# Patient Record
Sex: MALE | Race: ASIAN | NOT HISPANIC OR LATINO
[De-identification: names, ages, dates, MRNs, and addresses within clinical notes are randomized per-mention and may not be internally consistent; named-entity substitution may affect disease eponyms.]

---

## 2021-03-19 PROBLEM — Z00.00 ENCOUNTER FOR PREVENTIVE HEALTH EXAMINATION: Status: ACTIVE | Noted: 2021-03-19

## 2021-03-30 ENCOUNTER — APPOINTMENT (OUTPATIENT)
Dept: SURGICAL ONCOLOGY | Facility: CLINIC | Age: 66
End: 2021-03-30
Payer: MEDICARE

## 2021-03-30 VITALS
HEIGHT: 67 IN | SYSTOLIC BLOOD PRESSURE: 148 MMHG | RESPIRATION RATE: 16 BRPM | BODY MASS INDEX: 27.07 KG/M2 | OXYGEN SATURATION: 98 % | DIASTOLIC BLOOD PRESSURE: 92 MMHG | HEART RATE: 88 BPM | WEIGHT: 172.5 LBS

## 2021-03-30 DIAGNOSIS — K86.2 CYST OF PANCREAS: ICD-10-CM

## 2021-03-30 DIAGNOSIS — Z72.89 OTHER PROBLEMS RELATED TO LIFESTYLE: ICD-10-CM

## 2021-03-30 DIAGNOSIS — Z86.79 PERSONAL HISTORY OF OTHER DISEASES OF THE CIRCULATORY SYSTEM: ICD-10-CM

## 2021-03-30 DIAGNOSIS — Z80.0 FAMILY HISTORY OF MALIGNANT NEOPLASM OF DIGESTIVE ORGANS: ICD-10-CM

## 2021-03-30 DIAGNOSIS — Z78.9 OTHER SPECIFIED HEALTH STATUS: ICD-10-CM

## 2021-03-30 PROCEDURE — 99204 OFFICE O/P NEW MOD 45 MIN: CPT

## 2021-03-30 PROCEDURE — 99072 ADDL SUPL MATRL&STAF TM PHE: CPT

## 2021-03-31 ENCOUNTER — TRANSCRIPTION ENCOUNTER (OUTPATIENT)
Age: 66
End: 2021-03-31

## 2021-03-31 PROBLEM — Z78.9 CURRENT NON-SMOKER: Status: ACTIVE | Noted: 2021-03-31

## 2021-03-31 PROBLEM — Z80.0 FAMILY HISTORY OF MALIGNANT NEOPLASM OF STOMACH: Status: ACTIVE | Noted: 2021-03-31

## 2021-03-31 PROBLEM — Z72.89 ALCOHOL USE: Status: ACTIVE | Noted: 2021-03-31

## 2021-03-31 PROBLEM — Z86.79 HISTORY OF HYPERTENSION: Status: RESOLVED | Noted: 2021-03-31 | Resolved: 2021-03-31

## 2021-03-31 RX ORDER — FOSINOPRIL SODIUM 20 MG/1
20 TABLET ORAL
Refills: 0 | Status: ACTIVE | COMMUNITY

## 2021-03-31 NOTE — ASSESSMENT
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  Lesion 2 cm or greater or with high risks feature should undergo resection.  Local wedge resection is needed for grossly negative margin and significant hemigastrectomy is not needed.  There is also no need for rocío dissection.  Need for adjuvant Imatinib treatment will be based on size and mitotic index, but given size and location of his GIST, adjuvant Imatinib need is unlikely.\par Will obtain CT abdomen/pelvis for evaluation of anatomic location and also as baseline evaluation of pancreatic cystic neoplasm.\par He is a candidate for minimally invasive robotic partial gastrectomy.\par Risks/benefits explained.  All questions answered.  Patient wishes to proceed.

## 2021-03-31 NOTE — HISTORY OF PRESENT ILLNESS
[de-identified] : Mr. Krause is a 66 y/o male who underwent EGD 01/29/2021 for GERD.\par There was an incidental finding of a 2 cm subepithelial mass located in the greater curvature near the antrum.\par He was referred and underwent EUS 03/04/2021 which confirmed a 19 mm round subepithelial mass for which FNA was consistent with GIST.  There was an additional finding of a 9 mm anechoic lesion suggestive of a cyst in the pancreatic body without associated septation, mural nodule or pancreatic ductal dilatation.\par He offers no complaints of abdominal pain, nausea/emesis or weight loss.\par -----------------------------------------------------------------------------------------------------------\par Gastroenterology: Dr. Ferdinand Sams.\par Primary care physician: Dr. Ramy Wheeler.\par Advance GI: Dr. Cristi Solorzano.\par -----------------------------------------------------------------------------------------------------------\par

## 2021-03-31 NOTE — REASON FOR VISIT
[Initial Consultation] : an initial consultation for [Referred By: ___] : Referred By: [unfilled] [FreeTextEntry2] : gastric GIST

## 2021-03-31 NOTE — CONSULT LETTER
[Dear  ___] : Dear  [unfilled], [Consult Letter:] : I had the pleasure of evaluating your patient, [unfilled]. [Please see my note below.] : Please see my note below. [Consult Closing:] : Thank you very much for allowing me to participate in the care of this patient.  If you have any questions, please do not hesitate to contact me. [Sincerely,] : Sincerely, [FreeTextEntry2] : Dr. Ferdinand Sams [FreeTextEntry3] : Dallin Chavez\par (M) [phone]\par (O) [phone] [DrBenito  ___] : Dr. CUBA [DrBenito ___] : Dr. CUBA

## 2021-04-01 ENCOUNTER — OUTPATIENT (OUTPATIENT)
Dept: OUTPATIENT SERVICES | Facility: HOSPITAL | Age: 66
LOS: 1 days | End: 2021-04-01
Payer: MEDICARE

## 2021-04-01 ENCOUNTER — APPOINTMENT (OUTPATIENT)
Dept: CT IMAGING | Facility: IMAGING CENTER | Age: 66
End: 2021-04-01
Payer: MEDICARE

## 2021-04-01 DIAGNOSIS — K86.2 CYST OF PANCREAS: ICD-10-CM

## 2021-04-01 DIAGNOSIS — K31.89 OTHER DISEASES OF STOMACH AND DUODENUM: ICD-10-CM

## 2021-04-01 PROCEDURE — 74160 CT ABDOMEN W/CONTRAST: CPT | Mod: 26

## 2021-04-01 PROCEDURE — 74160 CT ABDOMEN W/CONTRAST: CPT

## 2021-04-01 PROCEDURE — 82565 ASSAY OF CREATININE: CPT

## 2021-04-07 ENCOUNTER — OUTPATIENT (OUTPATIENT)
Dept: OUTPATIENT SERVICES | Facility: HOSPITAL | Age: 66
LOS: 1 days | End: 2021-04-07
Payer: MEDICARE

## 2021-04-07 ENCOUNTER — RESULT REVIEW (OUTPATIENT)
Age: 66
End: 2021-04-07

## 2021-04-07 DIAGNOSIS — K31.89 OTHER DISEASES OF STOMACH AND DUODENUM: ICD-10-CM

## 2021-04-07 PROCEDURE — 88321 CONSLTJ&REPRT SLD PREP ELSWR: CPT

## 2021-04-08 ENCOUNTER — OUTPATIENT (OUTPATIENT)
Dept: OUTPATIENT SERVICES | Facility: HOSPITAL | Age: 66
LOS: 1 days | End: 2021-04-08
Payer: MEDICARE

## 2021-04-08 VITALS
HEART RATE: 71 BPM | TEMPERATURE: 97 F | SYSTOLIC BLOOD PRESSURE: 110 MMHG | RESPIRATION RATE: 14 BRPM | HEIGHT: 68 IN | OXYGEN SATURATION: 98 % | WEIGHT: 171.96 LBS | DIASTOLIC BLOOD PRESSURE: 82 MMHG

## 2021-04-08 DIAGNOSIS — G47.33 OBSTRUCTIVE SLEEP APNEA (ADULT) (PEDIATRIC): ICD-10-CM

## 2021-04-08 DIAGNOSIS — I10 ESSENTIAL (PRIMARY) HYPERTENSION: ICD-10-CM

## 2021-04-08 DIAGNOSIS — K31.89 OTHER DISEASES OF STOMACH AND DUODENUM: ICD-10-CM

## 2021-04-08 DIAGNOSIS — K21.9 GASTRO-ESOPHAGEAL REFLUX DISEASE WITHOUT ESOPHAGITIS: ICD-10-CM

## 2021-04-08 DIAGNOSIS — Z98.890 OTHER SPECIFIED POSTPROCEDURAL STATES: Chronic | ICD-10-CM

## 2021-04-08 LAB
ANION GAP SERPL CALC-SCNC: 9 MMOL/L — SIGNIFICANT CHANGE UP (ref 7–14)
BLD GP AB SCN SERPL QL: NEGATIVE — SIGNIFICANT CHANGE UP
BUN SERPL-MCNC: 20 MG/DL — SIGNIFICANT CHANGE UP (ref 7–23)
CALCIUM SERPL-MCNC: 9.5 MG/DL — SIGNIFICANT CHANGE UP (ref 8.4–10.5)
CHLORIDE SERPL-SCNC: 102 MMOL/L — SIGNIFICANT CHANGE UP (ref 98–107)
CO2 SERPL-SCNC: 28 MMOL/L — SIGNIFICANT CHANGE UP (ref 22–31)
CREAT SERPL-MCNC: 0.87 MG/DL — SIGNIFICANT CHANGE UP (ref 0.5–1.3)
GLUCOSE SERPL-MCNC: 103 MG/DL — HIGH (ref 70–99)
HCT VFR BLD CALC: 45 % — SIGNIFICANT CHANGE UP (ref 39–50)
HGB BLD-MCNC: 15.2 G/DL — SIGNIFICANT CHANGE UP (ref 13–17)
MCHC RBC-ENTMCNC: 33 PG — SIGNIFICANT CHANGE UP (ref 27–34)
MCHC RBC-ENTMCNC: 33.8 GM/DL — SIGNIFICANT CHANGE UP (ref 32–36)
MCV RBC AUTO: 97.6 FL — SIGNIFICANT CHANGE UP (ref 80–100)
NRBC # BLD: 0 /100 WBCS — SIGNIFICANT CHANGE UP
NRBC # FLD: 0 K/UL — SIGNIFICANT CHANGE UP
PLATELET # BLD AUTO: 190 K/UL — SIGNIFICANT CHANGE UP (ref 150–400)
POTASSIUM SERPL-MCNC: 4.1 MMOL/L — SIGNIFICANT CHANGE UP (ref 3.5–5.3)
POTASSIUM SERPL-SCNC: 4.1 MMOL/L — SIGNIFICANT CHANGE UP (ref 3.5–5.3)
RBC # BLD: 4.61 M/UL — SIGNIFICANT CHANGE UP (ref 4.2–5.8)
RBC # FLD: 12.3 % — SIGNIFICANT CHANGE UP (ref 10.3–14.5)
RH IG SCN BLD-IMP: POSITIVE — SIGNIFICANT CHANGE UP
SODIUM SERPL-SCNC: 139 MMOL/L — SIGNIFICANT CHANGE UP (ref 135–145)
WBC # BLD: 5.81 K/UL — SIGNIFICANT CHANGE UP (ref 3.8–10.5)
WBC # FLD AUTO: 5.81 K/UL — SIGNIFICANT CHANGE UP (ref 3.8–10.5)

## 2021-04-08 PROCEDURE — 93010 ELECTROCARDIOGRAM REPORT: CPT

## 2021-04-08 RX ORDER — SODIUM CHLORIDE 9 MG/ML
1000 INJECTION, SOLUTION INTRAVENOUS
Refills: 0 | Status: DISCONTINUED | OUTPATIENT
Start: 2021-04-21 | End: 2021-04-23

## 2021-04-08 NOTE — H&P PST ADULT - NSICDXPROBLEM_GEN_ALL_CORE_FT
PROBLEM DIAGNOSES  Problem: Other diseases of stomach and duodenum  Assessment and Plan: Robotic partial gastrectomy ,possible open   Medical clearance as per Dr Chavez  Preop instructions provided and patient verbalizes understanding.  Labs done and results pending.   Hibiclens provided with instructions and was signed by patient. Teach-back method was utilized to assess patient's understanding. Patient verbalized understanding.    Problem: GERD (gastroesophageal reflux disease)  Assessment and Plan: pt instructed to take omeprazole day of surgery.     Problem: Hypertension  Assessment and Plan: pt instructed to take fosinopril day of surgery ,monitor BP during hospital stay .     Problem: KENNETH (obstructive sleep apnea)  Assessment and Plan: Stop Bang questionaire positive ; OR faxed

## 2021-04-08 NOTE — H&P PST ADULT - NSICDXPASTSURGICALHX_GEN_ALL_CORE_FT
PAST SURGICAL HISTORY:  H/O detached retina repair right eye 2005    History of lumbar surgery 2010 - no hardware in place

## 2021-04-08 NOTE — H&P PST ADULT - HISTORY OF PRESENT ILLNESS
This is a 65 y.o. male with other diseases of stomach and duodenum . Pt had EGD, EUS and follow -up CT of abdomen . Pt offers no complaints in pst , now for surgery .

## 2021-04-08 NOTE — H&P PST ADULT - PHARYNX
Perennial with spring and fall allergy symptoms.  Increase him times around cats.  Nasal and ocular symptoms.  Still symptomatic despite oral antihistamines, nasal corticosteroids and ocular antihistamines.     Allergy testing:  Positive for trees, weeds, cat, dog, dust mite, grass, molds.     Cluster immunotherapy   The patient received green 0.1, green 0.4 and blue 0.1ml today. Each dose was  by 30 minutes. Full report will be scanned into electronic medical record. The pateint was in office for over 1.5 hours.      -Nasacort 2 sprays per nostril daily.  -Zyrtec daily.  -Patanol 1 drop per eye twice daily as needed.  -Singulair 10 mg by mouth daily.  -Continue allergy shots.   
no redness

## 2021-04-08 NOTE — H&P PST ADULT - EYES
Called patient and attempted to convey lab results. Patient will call back later as he is at a store right now.    Amylase and lipase are normal. Creatinine slightly elevated but same level it was previously. Dr. Iglesias touched base with Dr. Edmodnson and if pain does not improve patient should follow up with [facility].    PERRL/EOMI/conjunctiva clear detailed exam

## 2021-04-08 NOTE — H&P PST ADULT - NSICDXPASTMEDICALHX_GEN_ALL_CORE_FT
PAST MEDICAL HISTORY:  H/O gastroesophageal reflux (GERD)     History of hepatitis B     Hypertension

## 2021-04-08 NOTE — H&P PST ADULT - NSANTHOSAYNRD_GEN_A_CORE
No. KENNETH screening performed.  STOP BANG Legend: 0-2 = LOW Risk; 3-4 = INTERMEDIATE Risk; 5-8 = HIGH Risk

## 2021-04-12 LAB
ALBUMIN SERPL ELPH-MCNC: 4.6 G/DL — SIGNIFICANT CHANGE UP (ref 3.3–5)
ALP SERPL-CCNC: 58 U/L — SIGNIFICANT CHANGE UP (ref 40–120)
ALT FLD-CCNC: 18 U/L — SIGNIFICANT CHANGE UP (ref 4–41)
AST SERPL-CCNC: 26 U/L — SIGNIFICANT CHANGE UP (ref 4–40)
BILIRUB DIRECT SERPL-MCNC: <0.2 MG/DL — SIGNIFICANT CHANGE UP (ref 0–0.2)
BILIRUB INDIRECT FLD-MCNC: >0.4 MG/DL — SIGNIFICANT CHANGE UP (ref 0–1)
BILIRUB SERPL-MCNC: 0.6 MG/DL — SIGNIFICANT CHANGE UP (ref 0.2–1.2)
PROT SERPL-MCNC: 7.7 G/DL — SIGNIFICANT CHANGE UP (ref 6–8.3)

## 2021-04-18 ENCOUNTER — APPOINTMENT (OUTPATIENT)
Dept: DISASTER EMERGENCY | Facility: CLINIC | Age: 66
End: 2021-04-18

## 2021-04-18 DIAGNOSIS — Z01.818 ENCOUNTER FOR OTHER PREPROCEDURAL EXAMINATION: ICD-10-CM

## 2021-04-18 LAB — SARS-COV-2 N GENE NPH QL NAA+PROBE: NOT DETECTED

## 2021-04-20 ENCOUNTER — TRANSCRIPTION ENCOUNTER (OUTPATIENT)
Age: 66
End: 2021-04-20

## 2021-04-20 LAB — SURGICAL PATHOLOGY STUDY: SIGNIFICANT CHANGE UP

## 2021-04-20 NOTE — ASU PATIENT PROFILE, ADULT - PSH
H/O detached retina repair  right eye 2005  History of lumbar surgery  2010 - no hardware in place

## 2021-04-21 ENCOUNTER — RESULT REVIEW (OUTPATIENT)
Age: 66
End: 2021-04-21

## 2021-04-21 ENCOUNTER — INPATIENT (INPATIENT)
Facility: HOSPITAL | Age: 66
LOS: 1 days | Discharge: HOME | End: 2021-04-23
Attending: SURGERY | Admitting: SURGERY
Payer: MEDICARE

## 2021-04-21 ENCOUNTER — APPOINTMENT (OUTPATIENT)
Dept: SURGICAL ONCOLOGY | Facility: HOSPITAL | Age: 66
End: 2021-04-21

## 2021-04-21 VITALS
RESPIRATION RATE: 18 BRPM | WEIGHT: 171.96 LBS | TEMPERATURE: 98 F | SYSTOLIC BLOOD PRESSURE: 113 MMHG | HEART RATE: 78 BPM | OXYGEN SATURATION: 99 % | DIASTOLIC BLOOD PRESSURE: 71 MMHG | HEIGHT: 68 IN

## 2021-04-21 DIAGNOSIS — Z98.890 OTHER SPECIFIED POSTPROCEDURAL STATES: Chronic | ICD-10-CM

## 2021-04-21 DIAGNOSIS — K31.89 OTHER DISEASES OF STOMACH AND DUODENUM: ICD-10-CM

## 2021-04-21 PROCEDURE — 88341 IMHCHEM/IMCYTCHM EA ADD ANTB: CPT | Mod: 26

## 2021-04-21 PROCEDURE — 49905 OMENTAL FLAP INTRA-ABDOM: CPT

## 2021-04-21 PROCEDURE — 88342 IMHCHEM/IMCYTCHM 1ST ANTB: CPT | Mod: 26

## 2021-04-21 PROCEDURE — S2900 ROBOTIC SURGICAL SYSTEM: CPT | Mod: NC

## 2021-04-21 PROCEDURE — 43500 SURGICAL OPENING OF STOMACH: CPT

## 2021-04-21 PROCEDURE — 88309 TISSUE EXAM BY PATHOLOGIST: CPT | Mod: 26

## 2021-04-21 PROCEDURE — 43611 EXCISION OF STOMACH LESION: CPT

## 2021-04-21 PROCEDURE — 93010 ELECTROCARDIOGRAM REPORT: CPT

## 2021-04-21 RX ORDER — HYDROMORPHONE HYDROCHLORIDE 2 MG/ML
0.5 INJECTION INTRAMUSCULAR; INTRAVENOUS; SUBCUTANEOUS
Refills: 0 | Status: DISCONTINUED | OUTPATIENT
Start: 2021-04-21 | End: 2021-04-21

## 2021-04-21 RX ORDER — SODIUM CHLORIDE 9 MG/ML
250 INJECTION, SOLUTION INTRAVENOUS ONCE
Refills: 0 | Status: COMPLETED | OUTPATIENT
Start: 2021-04-21 | End: 2021-04-21

## 2021-04-21 RX ORDER — OMEPRAZOLE 10 MG/1
1 CAPSULE, DELAYED RELEASE ORAL
Qty: 0 | Refills: 0 | DISCHARGE

## 2021-04-21 RX ORDER — ACETAMINOPHEN 500 MG
650 TABLET ORAL EVERY 6 HOURS
Refills: 0 | Status: COMPLETED | OUTPATIENT
Start: 2021-04-21 | End: 2021-04-22

## 2021-04-21 RX ORDER — ONDANSETRON 8 MG/1
4 TABLET, FILM COATED ORAL ONCE
Refills: 0 | Status: DISCONTINUED | OUTPATIENT
Start: 2021-04-21 | End: 2021-04-21

## 2021-04-21 RX ORDER — FENTANYL CITRATE 50 UG/ML
25 INJECTION INTRAVENOUS
Refills: 0 | Status: DISCONTINUED | OUTPATIENT
Start: 2021-04-21 | End: 2021-04-21

## 2021-04-21 RX ORDER — FOSINOPRIL SODIUM 10 MG/1
1 TABLET ORAL
Qty: 0 | Refills: 0 | DISCHARGE

## 2021-04-21 RX ORDER — ENOXAPARIN SODIUM 100 MG/ML
40 INJECTION SUBCUTANEOUS EVERY 24 HOURS
Refills: 0 | Status: DISCONTINUED | OUTPATIENT
Start: 2021-04-21 | End: 2021-04-23

## 2021-04-21 RX ORDER — ERGOCALCIFEROL 1.25 MG/1
1 CAPSULE ORAL
Qty: 0 | Refills: 0 | DISCHARGE

## 2021-04-21 RX ORDER — METOPROLOL TARTRATE 50 MG
5 TABLET ORAL ONCE
Refills: 0 | Status: DISCONTINUED | OUTPATIENT
Start: 2021-04-21 | End: 2021-04-21

## 2021-04-21 RX ORDER — CEFOTETAN DISODIUM 1 G
2 VIAL (EA) INJECTION EVERY 12 HOURS
Refills: 0 | Status: COMPLETED | OUTPATIENT
Start: 2021-04-21 | End: 2021-04-22

## 2021-04-21 RX ORDER — METOPROLOL TARTRATE 50 MG
5 TABLET ORAL ONCE
Refills: 0 | Status: COMPLETED | OUTPATIENT
Start: 2021-04-21 | End: 2021-04-21

## 2021-04-21 RX ORDER — SODIUM CHLORIDE 9 MG/ML
500 INJECTION, SOLUTION INTRAVENOUS ONCE
Refills: 0 | Status: COMPLETED | OUTPATIENT
Start: 2021-04-21 | End: 2021-04-21

## 2021-04-21 RX ORDER — PANTOPRAZOLE SODIUM 20 MG/1
40 TABLET, DELAYED RELEASE ORAL EVERY 24 HOURS
Refills: 0 | Status: DISCONTINUED | OUTPATIENT
Start: 2021-04-21 | End: 2021-04-23

## 2021-04-21 RX ADMIN — PANTOPRAZOLE SODIUM 40 MILLIGRAM(S): 20 TABLET, DELAYED RELEASE ORAL at 23:35

## 2021-04-21 RX ADMIN — HYDROMORPHONE HYDROCHLORIDE 0.5 MILLIGRAM(S): 2 INJECTION INTRAMUSCULAR; INTRAVENOUS; SUBCUTANEOUS at 15:00

## 2021-04-21 RX ADMIN — SODIUM CHLORIDE 1000 MILLILITER(S): 9 INJECTION, SOLUTION INTRAVENOUS at 16:00

## 2021-04-21 RX ADMIN — SODIUM CHLORIDE 500 MILLILITER(S): 9 INJECTION, SOLUTION INTRAVENOUS at 13:21

## 2021-04-21 RX ADMIN — Medication 260 MILLIGRAM(S): at 19:43

## 2021-04-21 RX ADMIN — SODIUM CHLORIDE 75 MILLILITER(S): 9 INJECTION, SOLUTION INTRAVENOUS at 13:01

## 2021-04-21 RX ADMIN — Medication 650 MILLIGRAM(S): at 20:03

## 2021-04-21 RX ADMIN — ENOXAPARIN SODIUM 40 MILLIGRAM(S): 100 INJECTION SUBCUTANEOUS at 23:35

## 2021-04-21 RX ADMIN — HYDROMORPHONE HYDROCHLORIDE 0.5 MILLIGRAM(S): 2 INJECTION INTRAMUSCULAR; INTRAVENOUS; SUBCUTANEOUS at 14:45

## 2021-04-21 RX ADMIN — SODIUM CHLORIDE 30 MILLILITER(S): 9 INJECTION, SOLUTION INTRAVENOUS at 06:13

## 2021-04-21 RX ADMIN — Medication 100 GRAM(S): at 23:36

## 2021-04-21 RX ADMIN — Medication 5 MILLIGRAM(S): at 16:27

## 2021-04-21 NOTE — BRIEF OPERATIVE NOTE - OPERATION/FINDINGS
Robotic partial gastrectomy for GIST. Initial gastrectomy performed with stapling device, after EGD showed narrowing in the lumen, the staple line taken down and the gastrostomy closed with 2-0 V-lock transversely. EGD performed again demonstrating patent lumen, leak test negative

## 2021-04-21 NOTE — BRIEF OPERATIVE NOTE - NSICDXBRIEFPROCEDURE_GEN_ALL_CORE_FT
PROCEDURES:  Robot-assisted partial gastrectomy 21-Apr-2021 12:56:12  Abdulaziz Melton  EGD 21-Apr-2021 12:57:29  Abdulaziz Melton

## 2021-04-21 NOTE — CHART NOTE - NSCHARTNOTEFT_GEN_A_CORE
STATUS POST:  Robotic Partial Gastrectomy    POST OPERATIVE DAY #: 0    SUBJECTIVE: Pt seen and examined in PACU. He reports his pain is well controlled, he denies any nausea, vomiting, chest pain or shortness of breath.       Vital Signs Last 24 Hrs  T(C): 36.9 (21 Apr 2021 14:30), Max: 36.9 (21 Apr 2021 14:30)  T(F): 98.5 (21 Apr 2021 14:30), Max: 98.5 (21 Apr 2021 14:30)  HR: 113 (21 Apr 2021 14:45) (78 - 116)  BP: 122/80 (21 Apr 2021 14:45) (113/71 - 134/87)  BP(mean): 92 (21 Apr 2021 14:45) (88 - 101)  RR: 14 (21 Apr 2021 14:45) (13 - 18)  SpO2: 99% (21 Apr 2021 14:45) (93% - 100%)  I&O's Summary    21 Apr 2021 07:01  -  21 Apr 2021 15:10  --------------------------------------------------------  IN: 475 mL / OUT: 115 mL / NET: 360 mL      I&O's Detail    21 Apr 2021 07:01  -  21 Apr 2021 15:10  --------------------------------------------------------  IN:    Lactated Ringers: 225 mL    Lactated Ringers Bolus: 250 mL  Total IN: 475 mL    OUT:    Indwelling Catheter - Urethral (mL): 115 mL  Total OUT: 115 mL    Total NET: 360 mL      MEDICATIONS  (STANDING):  acetaminophen  IVPB .. 650 milliGRAM(s) IV Intermittent every 6 hours  cefoTEtan  IVPB 2 Gram(s) IV Intermittent every 12 hours  enoxaparin Injectable 40 milliGRAM(s) SubCutaneous every 24 hours  lactated ringers Bolus 500 milliLiter(s) IV Bolus once  lactated ringers. 1000 milliLiter(s) (75 mL/Hr) IV Continuous <Continuous>  pantoprazole  Injectable 40 milliGRAM(s) IV Push every 24 hours    MEDICATIONS  (PRN):  fentaNYL    Injectable 25 MICROGram(s) IV Push every 5 minutes PRN Moderate Pain (4 - 6)  HYDROmorphone  Injectable 0.5 milliGRAM(s) IV Push every 10 minutes PRN Severe Pain (7 - 10)  ondansetron Injectable 4 milliGRAM(s) IV Push once PRN Nausea and/or Vomiting        PHYSICAL EXAM:    Gen: NAD, A&Ox3  Chest: non labored breathing  Abd: soft, NTND, no evidence of rebound or guarding, robotic port sites are clean, dry and intact   Ext: warm, well perfused  : kahn catheter in place draining clear yellow urine        A/P: 65y Male s/p robotic partial gastrectomy    -NPO  -Pain Control  -DVT PPx  -AM Labs  -Encourage OOB/IS use   -Christina op ABx    DTeam Surgery P. [id number]

## 2021-04-21 NOTE — BRIEF OPERATIVE NOTE - NSICDXBRIEFPREOP_GEN_ALL_CORE_FT
PRE-OP DIAGNOSIS:  Gastrointestinal stromal tumor (GIST) of stomach 21-Apr-2021 12:56:21  Abdulaziz Melton

## 2021-04-22 LAB
ALBUMIN SERPL ELPH-MCNC: 3.8 G/DL — SIGNIFICANT CHANGE UP (ref 3.3–5)
ALP SERPL-CCNC: 42 U/L — SIGNIFICANT CHANGE UP (ref 40–120)
ALT FLD-CCNC: 67 U/L — HIGH (ref 4–41)
ANION GAP SERPL CALC-SCNC: 9 MMOL/L — SIGNIFICANT CHANGE UP (ref 7–14)
AST SERPL-CCNC: 59 U/L — HIGH (ref 4–40)
BILIRUB SERPL-MCNC: 1 MG/DL — SIGNIFICANT CHANGE UP (ref 0.2–1.2)
BUN SERPL-MCNC: 19 MG/DL — SIGNIFICANT CHANGE UP (ref 7–23)
CALCIUM SERPL-MCNC: 8.4 MG/DL — SIGNIFICANT CHANGE UP (ref 8.4–10.5)
CHLORIDE SERPL-SCNC: 103 MMOL/L — SIGNIFICANT CHANGE UP (ref 98–107)
CO2 SERPL-SCNC: 25 MMOL/L — SIGNIFICANT CHANGE UP (ref 22–31)
CREAT SERPL-MCNC: 0.91 MG/DL — SIGNIFICANT CHANGE UP (ref 0.5–1.3)
GLUCOSE SERPL-MCNC: 97 MG/DL — SIGNIFICANT CHANGE UP (ref 70–99)
HCT VFR BLD CALC: 40.7 % — SIGNIFICANT CHANGE UP (ref 39–50)
HGB BLD-MCNC: 14.1 G/DL — SIGNIFICANT CHANGE UP (ref 13–17)
MAGNESIUM SERPL-MCNC: 2.1 MG/DL — SIGNIFICANT CHANGE UP (ref 1.6–2.6)
MCHC RBC-ENTMCNC: 33.6 PG — SIGNIFICANT CHANGE UP (ref 27–34)
MCHC RBC-ENTMCNC: 34.6 GM/DL — SIGNIFICANT CHANGE UP (ref 32–36)
MCV RBC AUTO: 96.9 FL — SIGNIFICANT CHANGE UP (ref 80–100)
NRBC # BLD: 0 /100 WBCS — SIGNIFICANT CHANGE UP
NRBC # FLD: 0 K/UL — SIGNIFICANT CHANGE UP
PHOSPHATE SERPL-MCNC: 2.7 MG/DL — SIGNIFICANT CHANGE UP (ref 2.5–4.5)
PLATELET # BLD AUTO: 180 K/UL — SIGNIFICANT CHANGE UP (ref 150–400)
POTASSIUM SERPL-MCNC: 3.8 MMOL/L — SIGNIFICANT CHANGE UP (ref 3.5–5.3)
POTASSIUM SERPL-SCNC: 3.8 MMOL/L — SIGNIFICANT CHANGE UP (ref 3.5–5.3)
PROT SERPL-MCNC: 6.3 G/DL — SIGNIFICANT CHANGE UP (ref 6–8.3)
RBC # BLD: 4.2 M/UL — SIGNIFICANT CHANGE UP (ref 4.2–5.8)
RBC # FLD: 11.9 % — SIGNIFICANT CHANGE UP (ref 10.3–14.5)
SODIUM SERPL-SCNC: 137 MMOL/L — SIGNIFICANT CHANGE UP (ref 135–145)
WBC # BLD: 7.75 K/UL — SIGNIFICANT CHANGE UP (ref 3.8–10.5)
WBC # FLD AUTO: 7.75 K/UL — SIGNIFICANT CHANGE UP (ref 3.8–10.5)

## 2021-04-22 RX ORDER — POTASSIUM CHLORIDE 20 MEQ
10 PACKET (EA) ORAL ONCE
Refills: 0 | Status: COMPLETED | OUTPATIENT
Start: 2021-04-22 | End: 2021-04-22

## 2021-04-22 RX ADMIN — SODIUM CHLORIDE 75 MILLILITER(S): 9 INJECTION, SOLUTION INTRAVENOUS at 15:52

## 2021-04-22 RX ADMIN — Medication 260 MILLIGRAM(S): at 13:13

## 2021-04-22 RX ADMIN — PANTOPRAZOLE SODIUM 40 MILLIGRAM(S): 20 TABLET, DELAYED RELEASE ORAL at 22:19

## 2021-04-22 RX ADMIN — ENOXAPARIN SODIUM 40 MILLIGRAM(S): 100 INJECTION SUBCUTANEOUS at 22:19

## 2021-04-22 RX ADMIN — Medication 260 MILLIGRAM(S): at 05:51

## 2021-04-22 RX ADMIN — Medication 100 GRAM(S): at 05:52

## 2021-04-22 RX ADMIN — Medication 650 MILLIGRAM(S): at 14:01

## 2021-04-22 RX ADMIN — Medication 650 MILLIGRAM(S): at 06:19

## 2021-04-22 RX ADMIN — Medication 260 MILLIGRAM(S): at 22:18

## 2021-04-22 RX ADMIN — Medication 100 MILLIEQUIVALENT(S): at 09:00

## 2021-04-22 NOTE — PROGRESS NOTE ADULT - ASSESSMENT
Assessment  65y Male s/p robotic partial gastrectomy 4/21, recovering appropriately.    Plan:  -NPO  -Pain Control  -DVT PPx  -AM Labs  -Encourage OOB/IS use   -Christina op ABx    D Team Surgery   [id number]   Assessment  65y Male s/p robotic partial gastrectomy 4/21, recovering appropriately.    Plan:  - CLD  - Pain Control, ATC tylenol  - PT consult  - SACHI Puga in afternoon  - DVT PPx  - AM Labs  - Encourage OOB/IS use   - Christina op ABx    D Team Surgery   [id number]

## 2021-04-22 NOTE — PHYSICAL THERAPY INITIAL EVALUATION ADULT - PATIENT PROFILE REVIEW, REHAB EVAL
PT orders received: no formal activtiy order. Consult with ELPIDIO VILLASENOR, pt may participate in PT evaluation./yes PT orders received: no formal activity order. Consult with RN  Wale VILLASENOR, pt may participate in PT evaluation./yes

## 2021-04-22 NOTE — PROGRESS NOTE ADULT - ATTENDING COMMENTS
start clear liquids.  advance diet as tolerated.  expectant discharge tomorrow if tolerating regular diet.

## 2021-04-22 NOTE — PHYSICAL THERAPY INITIAL EVALUATION ADULT - PERTINENT HX OF CURRENT PROBLEM, REHAB EVAL
Patient is a 65 year old male admitted to [facility] with Gastrointestinal stromal tumor for scheduled procedure.

## 2021-04-22 NOTE — PHYSICAL THERAPY INITIAL EVALUATION ADULT - ADDITIONAL COMMENTS
Patient reports he lives with his wife in a private house, 3 steps to enter and 6 within. Patient reports he was previously independent in all ADLs and did not use an assistive device for ambulation.    Patient was left sitting at the edge of the bed, all lines/tubes intact and call bell within reach, RN aware

## 2021-04-22 NOTE — PROGRESS NOTE ADULT - SUBJECTIVE AND OBJECTIVE BOX
Surgery Progress Note    SUBJECTIVE: Pt seen and examined at bedside. No acute events overnight. This AM, patient comfortable and in no-apparent distress. No nausea, vomiting, diarrhea. Pain is controlled.    Vital Signs Last 24 Hrs  T(C): 37 (22 Apr 2021 00:36), Max: 37 (22 Apr 2021 00:36)  T(F): 98.6 (22 Apr 2021 00:36), Max: 98.6 (22 Apr 2021 00:36)  HR: 89 (22 Apr 2021 00:36) (78 - 116)  BP: 111/74 (22 Apr 2021 00:36) (108/84 - 134/87)  BP(mean): 99 (21 Apr 2021 20:00) (88 - 101)  RR: 18 (22 Apr 2021 00:36) (12 - 19)  SpO2: 98% (22 Apr 2021 00:36) (93% - 100%)    Physical Exam:  General Appearance: Appears well, NAD  Respiratory: No labored breathing  CV: Pulse regularly present  Abdomen: Soft, nontender, nondistended; port site incisions with dressings c/d/i  Genitourinary: oley catheter in place draining clear yellow urine    LABS:                INs and OUTs:    04-21-21 @ 07:01  -  04-22-21 @ 04:29  --------------------------------------------------------  IN: 1800 mL / OUT: 2040 mL / NET: -240 mL

## 2021-04-23 ENCOUNTER — TRANSCRIPTION ENCOUNTER (OUTPATIENT)
Age: 66
End: 2021-04-23

## 2021-04-23 VITALS
OXYGEN SATURATION: 98 % | RESPIRATION RATE: 17 BRPM | TEMPERATURE: 98 F | SYSTOLIC BLOOD PRESSURE: 143 MMHG | DIASTOLIC BLOOD PRESSURE: 96 MMHG | HEART RATE: 94 BPM

## 2021-04-23 LAB
ALBUMIN SERPL ELPH-MCNC: 4 G/DL — SIGNIFICANT CHANGE UP (ref 3.3–5)
ALP SERPL-CCNC: 48 U/L — SIGNIFICANT CHANGE UP (ref 40–120)
ALT FLD-CCNC: 68 U/L — HIGH (ref 4–41)
ANION GAP SERPL CALC-SCNC: 13 MMOL/L — SIGNIFICANT CHANGE UP (ref 7–14)
AST SERPL-CCNC: 48 U/L — HIGH (ref 4–40)
BILIRUB SERPL-MCNC: 1.4 MG/DL — HIGH (ref 0.2–1.2)
BUN SERPL-MCNC: 13 MG/DL — SIGNIFICANT CHANGE UP (ref 7–23)
CALCIUM SERPL-MCNC: 9 MG/DL — SIGNIFICANT CHANGE UP (ref 8.4–10.5)
CHLORIDE SERPL-SCNC: 103 MMOL/L — SIGNIFICANT CHANGE UP (ref 98–107)
CO2 SERPL-SCNC: 24 MMOL/L — SIGNIFICANT CHANGE UP (ref 22–31)
CREAT SERPL-MCNC: 0.96 MG/DL — SIGNIFICANT CHANGE UP (ref 0.5–1.3)
GLUCOSE SERPL-MCNC: 106 MG/DL — HIGH (ref 70–99)
HCT VFR BLD CALC: 46.7 % — SIGNIFICANT CHANGE UP (ref 39–50)
HGB BLD-MCNC: 15.9 G/DL — SIGNIFICANT CHANGE UP (ref 13–17)
MAGNESIUM SERPL-MCNC: 2.2 MG/DL — SIGNIFICANT CHANGE UP (ref 1.6–2.6)
MCHC RBC-ENTMCNC: 33 PG — SIGNIFICANT CHANGE UP (ref 27–34)
MCHC RBC-ENTMCNC: 34 GM/DL — SIGNIFICANT CHANGE UP (ref 32–36)
MCV RBC AUTO: 96.9 FL — SIGNIFICANT CHANGE UP (ref 80–100)
NRBC # BLD: 0 /100 WBCS — SIGNIFICANT CHANGE UP
NRBC # FLD: 0 K/UL — SIGNIFICANT CHANGE UP
PHOSPHATE SERPL-MCNC: 2.3 MG/DL — LOW (ref 2.5–4.5)
PLATELET # BLD AUTO: 173 K/UL — SIGNIFICANT CHANGE UP (ref 150–400)
POTASSIUM SERPL-MCNC: 3.9 MMOL/L — SIGNIFICANT CHANGE UP (ref 3.5–5.3)
POTASSIUM SERPL-SCNC: 3.9 MMOL/L — SIGNIFICANT CHANGE UP (ref 3.5–5.3)
PROT SERPL-MCNC: 7.2 G/DL — SIGNIFICANT CHANGE UP (ref 6–8.3)
RBC # BLD: 4.82 M/UL — SIGNIFICANT CHANGE UP (ref 4.2–5.8)
RBC # FLD: 12 % — SIGNIFICANT CHANGE UP (ref 10.3–14.5)
SODIUM SERPL-SCNC: 140 MMOL/L — SIGNIFICANT CHANGE UP (ref 135–145)
WBC # BLD: 7.36 K/UL — SIGNIFICANT CHANGE UP (ref 3.8–10.5)
WBC # FLD AUTO: 7.36 K/UL — SIGNIFICANT CHANGE UP (ref 3.8–10.5)

## 2021-04-23 RX ORDER — PSYLLIUM SEED (WITH DEXTROSE)
1 POWDER (GRAM) ORAL DAILY
Refills: 0 | Status: DISCONTINUED | OUTPATIENT
Start: 2021-04-23 | End: 2021-04-23

## 2021-04-23 RX ORDER — PSYLLIUM SEED (WITH DEXTROSE)
1 POWDER (GRAM) ORAL
Qty: 0 | Refills: 0 | DISCHARGE
Start: 2021-04-23

## 2021-04-23 RX ADMIN — Medication 1 PACKET(S): at 10:12

## 2021-04-23 NOTE — DISCHARGE NOTE NURSING/CASE MANAGEMENT/SOCIAL WORK - PATIENT PORTAL LINK FT
You can access the FollowMyHealth Patient Portal offered by [facility] by registering at the following website: [email or web address]. By joining [facility]’s FollowMyHealth portal, you will also be able to view your health information using other applications (apps) compatible with our system.

## 2021-04-23 NOTE — DISCHARGE NOTE PROVIDER - NSDCFUADDAPPT_GEN_ALL_CORE_FT
Follow up with your surgeon, Dr. Chavez, call for an appointment. Please follow up with your primary care physician regarding your hospitalization. Do not drive while taking pain medications

## 2021-04-23 NOTE — DISCHARGE NOTE NURSING/CASE MANAGEMENT/SOCIAL WORK - NSDCPNINST_GEN_ALL_CORE
please call your provider or go to the ER if you experience any chest pain, shortness of breath, uncontrolled pain, fever > 100.4, nausea/vomiting, or pus/bleeding from surgical incision

## 2021-04-23 NOTE — PROGRESS NOTE ADULT - SUBJECTIVE AND OBJECTIVE BOX
Surgery Progress Note    SUBJECTIVE: Pt seen and examined at bedside. No acute events overnight. This AM, patient comfortable and in no-apparent distress. Pain is controlled. Tolerating full liquids without nausea or vomiting.    Vital Signs Last 24 Hrs  T(C): 37.1 (23 Apr 2021 00:30), Max: 37.6 (22 Apr 2021 20:57)  T(F): 98.7 (23 Apr 2021 00:30), Max: 99.7 (22 Apr 2021 20:57)  HR: 90 (23 Apr 2021 00:30) (78 - 92)  BP: 110/85 (23 Apr 2021 00:30) (110/85 - 132/92)  BP(mean): --  RR: 18 (23 Apr 2021 00:30) (18 - 18)  SpO2: 96% (23 Apr 2021 00:30) (96% - 100%)    Physical Exam:  General Appearance: Appears well, NAD  Respiratory: No labored breathing  CV: Pulse regularly present  Abdomen: Soft, nontender, nondistended; port site incisions with dressings c/d/i      LABS:                        14.1   7.75  )-----------( 180      ( 22 Apr 2021 06:35 )             40.7     04-22    137  |  103  |  19  ----------------------------<  97  3.8   |  25  |  0.91    Ca    8.4      22 Apr 2021 06:35  Phos  2.7     04-22  Mg     2.1     04-22    TPro  6.3  /  Alb  3.8  /  TBili  1.0  /  DBili  x   /  AST  59<H>  /  ALT  67<H>  /  AlkPhos  42  04-22          INs and OUTs:    04-21-21 @ 07:01  -  04-22-21 @ 07:00  --------------------------------------------------------  IN: 2250 mL / OUT: 2800 mL / NET: -550 mL    04-22-21 @ 07:01  -  04-23-21 @ 02:02  --------------------------------------------------------  IN: 700 mL / OUT: 0 mL / NET: 700 mL     Surgery Progress Note    SUBJECTIVE: Pt seen and examined at bedside. No acute events overnight. This AM, patient comfortable and in no-apparent distress. Pain is controlled. Tolerating full liquids without nausea or vomiting.    Vital Signs Last 24 Hrs  T(C): 37.1 (23 Apr 2021 00:30), Max: 37.6 (22 Apr 2021 20:57)  T(F): 98.7 (23 Apr 2021 00:30), Max: 99.7 (22 Apr 2021 20:57)  HR: 90 (23 Apr 2021 00:30) (78 - 92)  BP: 110/85 (23 Apr 2021 00:30) (110/85 - 132/92)  BP(mean): --  RR: 18 (23 Apr 2021 00:30) (18 - 18)  SpO2: 96% (23 Apr 2021 00:30) (96% - 100%)    Physical Exam:  GEN: resting in bed comfortably in NAD  RESP: no increased WOB  ABD: soft, non-distended, non-tender without rebound tenderness or guarding; port site incisions with dressings c/d/i  EXTR: warm, well-perfused without gross deformities; spontaneous movement in b/l U/L extrem  NEURO: awake, alert       LABS:                        14.1   7.75  )-----------( 180      ( 22 Apr 2021 06:35 )             40.7     04-22    137  |  103  |  19  ----------------------------<  97  3.8   |  25  |  0.91    Ca    8.4      22 Apr 2021 06:35  Phos  2.7     04-22  Mg     2.1     04-22    TPro  6.3  /  Alb  3.8  /  TBili  1.0  /  DBili  x   /  AST  59<H>  /  ALT  67<H>  /  AlkPhos  42  04-22          INs and OUTs:    04-21-21 @ 07:01  -  04-22-21 @ 07:00  --------------------------------------------------------  IN: 2250 mL / OUT: 2800 mL / NET: -550 mL    04-22-21 @ 07:01  -  04-23-21 @ 02:02  --------------------------------------------------------  IN: 700 mL / OUT: 0 mL / NET: 700 mL

## 2021-04-23 NOTE — DISCHARGE NOTE PROVIDER - CARE PROVIDER_API CALL
Dallin Chavez)  Surgery  [address] [place] [address]  Phone: [phone]  Fax: [phone]  Follow Up Time: 1 week

## 2021-04-23 NOTE — DISCHARGE NOTE PROVIDER - HOSPITAL COURSE
This is a 65 y.o. male with other diseases of stomach and duodenum . Pt had EGD, EUS and follow -up CT of abdomen . Pt offers no complaints in pst , now for surgery  for his GIST.  Patient taken to the OR s/p robotic partial gastrectomy for GIST. Initial gastrectomy performed with stapling device, after EGD showed narrowing in the lumen, the staple line taken down and the gastrostomy closed with 2-0 V-lock transversely. EGD performed again demonstrating patent lumen, leak test negative  Post operatively patient hemodynamically stable and transferred to the floor.  Diet advanced and tolerated. Pain well controlled and patient OOB and ambulating.  Patient stable for dc home to follow up as an outpatient   This is a 65 y.o. male with other diseases of stomach and duodenum . Pt had EGD, EUS and follow -up CT of abdomen . Pt offers no complaints in pst , now for surgery  for his GIST.  Patient taken to the OR s/p robotic partial gastrectomy for GIST. Initial gastrectomy performed with stapling device, after EGD showed narrowing in the lumen, the staple line taken down and the gastrostomy closed with 2-0 V-lock transversely. EGD performed again demonstrating patent lumen, leak test negative  Post operatively patient hemodynamically stable and transferred to the floor.  Diet advanced and tolerated. Pain well controlled and patient OOB and ambulating.  Patient stable for dc home to follow up as an outpatient.

## 2021-04-23 NOTE — DISCHARGE NOTE PROVIDER - NSDCFUADDINST_GEN_ALL_CORE_FT
WOUND CARE:  Please keep incisions clean and dry. Please do not Scrub or rub incisions. Do not use lotion or powder on incisions.   BATHING: You may shower and/or sponge bathe. You may use warm soapy water in the shower and rinse, pat dry.  ACTIVITY: No heavy lifting or straining. Otherwise, you may return to your usual level of physical activity. If you are taking narcotic pain medication DO NOT drive a car, operate machinery or make important decisions.  DIET: Return to your usual diet.  NOTIFY YOUR SURGEON IF YOU HAVE: any bleeding that does not stop, any pus draining from your wound(s), any fever (over 100.4 F) persistent nausea/vomiting, or if your pain is not controlled on your discharge pain medications, unable to urinate.  Please follow up with your primary care physician in one week regarding your hospitalization, bring copies of your discharge paperwork.  Please follow up with your surgeon, Dr. Chavez as an outpatient, please call to schedule appointment

## 2021-04-23 NOTE — DISCHARGE NOTE PROVIDER - NSDCMRMEDTOKEN_GEN_ALL_CORE_FT
fosinopril 20 mg oral tablet: 1 tab(s) orally once a day  omeprazole 40 mg oral delayed release capsule: 1 cap(s) orally once a day  Vitamin D2 50,000 intl units (1.25 mg) oral capsule: 1 cap(s) orally once a week   fosinopril 20 mg oral tablet: 1 tab(s) orally once a day  omeprazole 40 mg oral delayed release capsule: 1 cap(s) orally once a day  psyllium 3.4 g/7 g oral powder for reconstitution: 1 packet(s) orally once a day  Vitamin D2 50,000 intl units (1.25 mg) oral capsule: 1 cap(s) orally once a week

## 2021-04-23 NOTE — DISCHARGE NOTE PROVIDER - NSDCCPCAREPLAN_GEN_ALL_CORE_FT
PRINCIPAL DISCHARGE DIAGNOSIS  Diagnosis: Gastrointestinal stromal tumor (GIST)  Assessment and Plan of Treatment:

## 2021-04-23 NOTE — PROGRESS NOTE ADULT - ASSESSMENT
Assessment  65y Male s/p robotic partial gastrectomy 4/21, recovering appropriately.    Plan:  - Diet: full liquids  - Pain Control, ATC tylenol  - PT consult  - Sanon removed 4/22  - DVT PPx  - AM Labs  - Encourage OOB/IS use   - Christina op ABx    D Team Surgery   [id number] Assessment  65y Male s/p robotic partial gastrectomy 4/21, recovering appropriately. Plan to d/c home today    Plan:  - Diet: advance to regular diet today  - Pain Control, ATC tylenol  - PT consult  - DVT PPx  - AM Labs  - Encourage OOB/IS use   - D/c home today    D Team Surgery   [id number]

## 2021-04-28 LAB — SURGICAL PATHOLOGY STUDY: SIGNIFICANT CHANGE UP

## 2021-05-10 PROBLEM — Z86.19 PERSONAL HISTORY OF OTHER INFECTIOUS AND PARASITIC DISEASES: Chronic | Status: ACTIVE | Noted: 2021-04-08

## 2021-05-10 PROBLEM — Z87.19 PERSONAL HISTORY OF OTHER DISEASES OF THE DIGESTIVE SYSTEM: Chronic | Status: ACTIVE | Noted: 2021-04-08

## 2021-05-10 PROBLEM — I10 ESSENTIAL (PRIMARY) HYPERTENSION: Chronic | Status: ACTIVE | Noted: 2021-04-08

## 2021-05-12 ENCOUNTER — APPOINTMENT (OUTPATIENT)
Dept: SURGICAL ONCOLOGY | Facility: CLINIC | Age: 66
End: 2021-05-12
Payer: MEDICARE

## 2021-05-12 DIAGNOSIS — K31.89 OTHER DISEASES OF STOMACH AND DUODENUM: ICD-10-CM

## 2021-05-12 PROCEDURE — 99024 POSTOP FOLLOW-UP VISIT: CPT

## 2021-11-02 NOTE — ASU PREOP CHECKLIST - MEDICAL/PEDIATRIC CLEARANCE ON MEDICAL RECORD
on chart V-Y Flap Text: The defect edges were debeveled with a #15 scalpel blade.  Given the location of the defect, shape of the defect and the proximity to free margins a V-Y flap was deemed most appropriate.  Using a sterile surgical marker, an appropriate advancement flap was drawn incorporating the defect and placing the expected incisions within the relaxed skin tension lines where possible.    The area thus outlined was incised deep to adipose tissue with a #15 scalpel blade.  The skin margins were undermined to an appropriate distance in all directions utilizing iris scissors.

## 2021-11-25 ENCOUNTER — TRANSCRIPTION ENCOUNTER (OUTPATIENT)
Age: 66
End: 2021-11-25

## 2022-01-11 ENCOUNTER — TRANSCRIPTION ENCOUNTER (OUTPATIENT)
Age: 67
End: 2022-01-11

## 2022-08-06 NOTE — ASU PATIENT PROFILE, ADULT - SUBSTANCE USE COMMENT, PROFILE
AO x1/2. Hx of dementia. Wife at bedside. Takes meds crushed with applesauce. RA. Lungs are diminished. Tele - NSR with PAC or PVC. Eliquis. Briefed. Incontinent. Yeager draining dark yellow urine. No reports of pain. Able to sit at edge of bed with assistance. Able to roll side to side when changing. Q2 turn. Zosyn. Sacrum - some redness, cream applied and mepliax. IV SL. Regular diet - [place] to chew, chopped pieces. Pt wife updated on POC. No further needs at this time. Patient will discharge with hospice. Awaiting sensitivities from cultures.       Problem: HEMATOLOGIC - ADULT  Goal: Maintains hematologic stability  Description: INTERVENTIONS  - Assess for signs and symptoms of bleeding or hemorrhage  - Monitor labs and vital signs for trends  - Administer supportive blood products/factors, fluids and medications as ordered and appropriate  - Administer supportive blood products/factors as ordered and appropriate  Outcome: Progressing  Goal: Free from bleeding injury  Description: (Example usage: patient with low platelets)  INTERVENTIONS:  - Avoid intramuscular injections, enemas and rectal medication administration  - Ensure safe mobilization of patient  - Hold pressure on venipuncture sites to achieve adequate hemostasis  - Assess for signs and symptoms of internal bleeding  - Monitor lab trends  - Patient is to report abnormal signs of bleeding to staff  - Avoid use of toothpicks and dental floss  - Use electric shaver for shaving  - Use soft bristle tooth brush  - Limit straining and forceful nose blowing  Outcome: Progressing     Problem: NEUROLOGICAL - ADULT  Goal: Achieves stable or improved neurological status  Description: INTERVENTIONS  - Assess for and report changes in neurological status  - Initiate measures to prevent increased intracranial pressure  - Maintain blood pressure and fluid volume within ordered parameters to optimize cerebral perfusion and minimize risk of hemorrhage  - Monitor temperature, glucose, and sodium.  Initiate appropriate interventions as ordered  Outcome: Progressing n/a

## 2023-02-09 NOTE — PHYSICAL THERAPY INITIAL EVALUATION ADULT - DISCHARGE DISPOSITION, PT EVAL
home/no skilled PT needs
Recommendation Preamble: The following recommendations were made during the visit:
Render Risk Assessment In Note?: no
Detail Level: Zone
